# Patient Record
Sex: FEMALE | Race: WHITE | URBAN - METROPOLITAN AREA
[De-identification: names, ages, dates, MRNs, and addresses within clinical notes are randomized per-mention and may not be internally consistent; named-entity substitution may affect disease eponyms.]

---

## 2020-03-12 PROBLEM — Z00.00 ENCOUNTER FOR PREVENTIVE HEALTH EXAMINATION: Status: ACTIVE | Noted: 2020-03-12

## 2023-02-02 ENCOUNTER — OFFICE (OUTPATIENT)
Dept: URBAN - METROPOLITAN AREA CLINIC 38 | Facility: CLINIC | Age: 63
Setting detail: OPHTHALMOLOGY
End: 2023-02-02
Payer: COMMERCIAL

## 2023-02-02 DIAGNOSIS — H11.32: ICD-10-CM

## 2023-02-02 PROBLEM — G43.009 MIGRAINE-W/O AURA: Status: ACTIVE | Noted: 2023-02-02

## 2023-02-02 PROBLEM — D31.40 IRIS NEVUS: Status: ACTIVE | Noted: 2023-02-02

## 2023-02-02 PROCEDURE — 99203 OFFICE O/P NEW LOW 30 MIN: CPT | Performed by: OPHTHALMOLOGY

## 2023-02-02 ASSESSMENT — KERATOMETRY
OD_K1POWER_DIOPTERS: 43.75
OD_AXISANGLE_DEGREES: 62
OS_K1POWER_DIOPTERS: 43.50
METHOD_AUTO_MANUAL: AUTO
OD_K2POWER_DIOPTERS: 44.00
OS_K2POWER_DIOPTERS: 44.25
OS_AXISANGLE_DEGREES: 75

## 2023-02-02 ASSESSMENT — CONFRONTATIONAL VISUAL FIELD TEST (CVF)
OS_FINDINGS: FULL
OD_FINDINGS: FULL

## 2023-02-02 ASSESSMENT — SPHEQUIV_DERIVED
OD_SPHEQUIV: 1.375
OS_SPHEQUIV: 0.875

## 2023-02-02 ASSESSMENT — REFRACTION_CURRENTRX
OS_SPHERE: +2.50
OD_VPRISM_DIRECTION: SV
OS_VPRISM_DIRECTION: SV
OS_OVR_VA: 20/
OD_SPHERE: +2.50
OD_OVR_VA: 20/

## 2023-02-02 ASSESSMENT — REFRACTION_MANIFEST
OD_SPHERE: +0.50
OD_CYLINDER: SPHERE
OS_VA1: 20/20
OS_VA2: 20/20(J1+)
OD_VA2: 20/20(J1+)
OU_VA: 20/20
OS_CYLINDER: SPHERE
OD_VA1: 20/20
OS_SPHERE: +0.25
OS_ADD: +2.50
OD_ADD: +2.50

## 2023-02-02 ASSESSMENT — AXIALLENGTH_DERIVED
OD_AL: 22.9371
OS_AL: 23.1228

## 2023-02-02 ASSESSMENT — REFRACTION_AUTOREFRACTION
OS_SPHERE: +1.00
OD_AXIS: 105
OD_SPHERE: +2.00
OS_CYLINDER: -0.25
OS_AXIS: 131
OD_CYLINDER: -1.25

## 2023-02-02 ASSESSMENT — VISUAL ACUITY
OS_BCVA: 20/30-
OD_BCVA: 20/30-2

## 2023-08-01 ENCOUNTER — OFFICE (OUTPATIENT)
Dept: URBAN - METROPOLITAN AREA CLINIC 8 | Facility: CLINIC | Age: 63
Setting detail: OPHTHALMOLOGY
End: 2023-08-01
Payer: COMMERCIAL

## 2023-08-01 DIAGNOSIS — H35.372: ICD-10-CM

## 2023-08-01 DIAGNOSIS — H52.4: ICD-10-CM

## 2023-08-01 DIAGNOSIS — H25.13: ICD-10-CM

## 2023-08-01 DIAGNOSIS — G43.009: ICD-10-CM

## 2023-08-01 DIAGNOSIS — D31.40: ICD-10-CM

## 2023-08-01 PROCEDURE — 92014 COMPRE OPH EXAM EST PT 1/>: CPT

## 2023-08-01 PROCEDURE — 92015 DETERMINE REFRACTIVE STATE: CPT

## 2023-08-01 PROCEDURE — 92134 CPTRZ OPH DX IMG PST SGM RTA: CPT

## 2023-08-01 ASSESSMENT — KERATOMETRY
METHOD_AUTO_MANUAL: AUTO
OD_K1POWER_DIOPTERS: 43.50
OD_K2POWER_DIOPTERS: 43.75
OS_AXISANGLE_DEGREES: 078
OS_K2POWER_DIOPTERS: 44.25
OS_K1POWER_DIOPTERS: 43.75
OD_AXISANGLE_DEGREES: 068

## 2023-08-01 ASSESSMENT — SPHEQUIV_DERIVED
OD_SPHEQUIV: 1.875
OS_SPHEQUIV: 1.625
OS_SPHEQUIV: 1.5
OD_SPHEQUIV: 2.125

## 2023-08-01 ASSESSMENT — REFRACTION_AUTOREFRACTION
OD_SPHERE: +2.50
OD_CYLINDER: -0.75
OD_AXIS: 101
OS_CYLINDER: -0.25
OS_AXIS: 119
OS_SPHERE: +1.75

## 2023-08-01 ASSESSMENT — VISUAL ACUITY
OS_BCVA: 20/100
OD_BCVA: 20/70

## 2023-08-01 ASSESSMENT — REFRACTION_MANIFEST
OD_ADD: +2.50
OS_VA2: 20/20(J1+)
OS_AXIS: 120
OS_SPHERE: +1.75
OS_ADD: +2.50
OD_VA1: 20/20-2
OS_VA1: 20/20-2
OS_CYLINDER: -0.50
OU_VA: 20/20
OD_CYLINDER: -0.75
OD_VA2: 20/20(J1+)
OD_SPHERE: +2.25
OD_AXIS: 100

## 2023-08-01 ASSESSMENT — AXIALLENGTH_DERIVED
OD_AL: 22.7493
OS_AL: 22.8481
OS_AL: 22.8025
OD_AL: 22.8402

## 2023-08-01 ASSESSMENT — REFRACTION_CURRENTRX
OD_OVR_VA: 20/
OS_OVR_VA: 20/
OS_VPRISM_DIRECTION: SV
OD_VPRISM_DIRECTION: SV
OS_SPHERE: +2.50
OD_SPHERE: +2.50

## 2023-08-01 ASSESSMENT — CONFRONTATIONAL VISUAL FIELD TEST (CVF)
OD_FINDINGS: FULL
OS_FINDINGS: FULL

## 2024-06-12 ENCOUNTER — APPOINTMENT (OUTPATIENT)
Dept: ORTHOPEDIC SURGERY | Facility: CLINIC | Age: 64
End: 2024-06-12
Payer: OTHER MISCELLANEOUS

## 2024-06-12 DIAGNOSIS — M24.131 OTHER ARTICULAR CARTILAGE DISORDERS, RIGHT WRIST: ICD-10-CM

## 2024-06-12 PROCEDURE — 73100 X-RAY EXAM OF WRIST: CPT | Mod: RT

## 2024-06-12 PROCEDURE — 99203 OFFICE O/P NEW LOW 30 MIN: CPT

## 2024-06-12 NOTE — PHYSICAL EXAM
[TFCC] : TFCC [4___] : volarflexion 4[unfilled]/5 [] : good capillary refill in all fingers [Right] : right wrist [There are no fractures, subluxations or dislocations. No significant abnormalities are seen] : There are no fractures, subluxations or dislocations. No significant abnormalities are seen

## 2024-06-12 NOTE — HISTORY OF PRESENT ILLNESS
[de-identified] :  Patient presents for evaluation on RT wrist pain. 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 She typically does a lot of lifting, carrying, moving items.  Patient states this is her initial assessment since she has been so busy at work. Patient presents with wrist strap. States that she is having weakness in her wrist and difficulty with certain ROM. Patient is taking Advil as needed. Patient is RHD.

## 2024-06-12 NOTE — DISCUSSION/SUMMARY
[de-identified] : I reviewed patient's radiographs and discussed her condition and treatment options.  Defer further imaging or injection.  I advised seeing hand OT and fabrication of brace as needed.   Follow up in 4 weeks.  Patient voiced understanding and agreement with the plan.

## 2024-09-18 ENCOUNTER — APPOINTMENT (OUTPATIENT)
Dept: ORTHOPEDIC SURGERY | Facility: CLINIC | Age: 64
End: 2024-09-18
Payer: OTHER MISCELLANEOUS

## 2024-09-18 DIAGNOSIS — M24.131 OTHER ARTICULAR CARTILAGE DISORDERS, RIGHT WRIST: ICD-10-CM

## 2024-09-18 PROCEDURE — 99213 OFFICE O/P EST LOW 20 MIN: CPT

## 2024-09-18 NOTE — DISCUSSION/SUMMARY
[de-identified] : I reviewed patient's prior wrist radiographs and discussed her condition and treatment options.  Given continued pain and interference with ADLS despite NSAIDs, physical therapy, rest, and HEP, I advised obtaining further imaging.  Based on the results of the MRI, more invasive interventions including injections and surgery may be indicated.  Patient voiced understanding and agreement with the plan.

## 2024-09-18 NOTE — PHYSICAL EXAM
[TFCC] : TFCC [4___] : volarflexion 4[unfilled]/5 [Right] : right wrist [There are no fractures, subluxations or dislocations. No significant abnormalities are seen] : There are no fractures, subluxations or dislocations. No significant abnormalities are seen [] : no erythema

## 2024-09-18 NOTE — HISTORY OF PRESENT ILLNESS
[de-identified] :  Since last visit, patient reports that she is still having some stiffness and soreness. States improvement with OT but therapist is wondering if further imaging needs to happen.

## 2024-10-02 ENCOUNTER — APPOINTMENT (OUTPATIENT)
Dept: MRI IMAGING | Facility: CLINIC | Age: 64
End: 2024-10-02
Payer: OTHER MISCELLANEOUS

## 2024-10-02 PROCEDURE — 73221 MRI JOINT UPR EXTREM W/O DYE: CPT | Mod: RT

## 2024-10-03 ENCOUNTER — OFFICE (OUTPATIENT)
Dept: URBAN - METROPOLITAN AREA CLINIC 28 | Facility: CLINIC | Age: 64
Setting detail: OPHTHALMOLOGY
End: 2024-10-03
Payer: COMMERCIAL

## 2024-10-03 VITALS — HEIGHT: 55 IN

## 2024-10-03 DIAGNOSIS — H43.391: ICD-10-CM

## 2024-10-03 DIAGNOSIS — H52.4: ICD-10-CM

## 2024-10-03 DIAGNOSIS — H16.223: ICD-10-CM

## 2024-10-03 DIAGNOSIS — H25.13: ICD-10-CM

## 2024-10-03 DIAGNOSIS — G43.009: ICD-10-CM

## 2024-10-03 DIAGNOSIS — D31.40: ICD-10-CM

## 2024-10-03 DIAGNOSIS — D31.31: ICD-10-CM

## 2024-10-03 DIAGNOSIS — H35.372: ICD-10-CM

## 2024-10-03 PROCEDURE — 92014 COMPRE OPH EXAM EST PT 1/>: CPT | Performed by: OPHTHALMOLOGY

## 2024-10-03 PROCEDURE — 92202 OPSCPY EXTND ON/MAC DRAW: CPT | Performed by: OPHTHALMOLOGY

## 2024-10-03 PROCEDURE — 92015 DETERMINE REFRACTIVE STATE: CPT | Performed by: OPHTHALMOLOGY

## 2024-10-03 PROCEDURE — 92134 CPTRZ OPH DX IMG PST SGM RTA: CPT | Performed by: OPHTHALMOLOGY

## 2024-10-03 ASSESSMENT — TONOMETRY
OS_IOP_MMHG: 14
OD_IOP_MMHG: 13

## 2024-10-03 ASSESSMENT — REFRACTION_AUTOREFRACTION
OS_CYLINDER: -0.25
OD_CYLINDER: -0.50
OD_AXIS: 091
OS_SPHERE: +1.75
OD_SPHERE: +2.50
OS_AXIS: 152

## 2024-10-03 ASSESSMENT — REFRACTION_MANIFEST
OS_AXIS: 150
OS_VA1: 20/20-2
OS_CYLINDER: -0.25
OD_VA1: 20/25+
OD_CYLINDER: -0.75
OS_VA1: 20/20
OD_AXIS: 095
OD_VA2: 20/20(J1+)
OD_ADD: +2.50
OS_VA2: 20/20(J1+)
OS_ADD: +2.50
OS_AXIS: 120
OD_SPHERE: +2.25
OS_ADD: +2.50
OU_VA: 20/20
OD_ADD: +2.50
OS_CYLINDER: -0.50
OS_SPHERE: +1.75
OD_VA1: 20/20-2
OD_AXIS: 100
OD_CYLINDER: -0.25
OS_SPHERE: +1.75
OD_SPHERE: +2.25

## 2024-10-03 ASSESSMENT — KERATOMETRY
OD_K1POWER_DIOPTERS: 43.75
OD_K2POWER_DIOPTERS: 44.25
OS_K2POWER_DIOPTERS: 44.50
OD_AXISANGLE_DEGREES: 087
OS_AXISANGLE_DEGREES: 074
METHOD_AUTO_MANUAL: AUTO
OS_K1POWER_DIOPTERS: 43.50

## 2024-10-03 ASSESSMENT — REFRACTION_CURRENTRX
OS_OVR_VA: 20/
OS_SPHERE: +2.50
OD_SPHERE: +2.50
OD_OVR_VA: 20/
OD_VPRISM_DIRECTION: SV
OS_VPRISM_DIRECTION: SV

## 2024-10-03 ASSESSMENT — TEAR BREAK UP TIME (TBUT)
OS_TBUT: 1+
OD_TBUT: 1+

## 2024-10-03 ASSESSMENT — CONFRONTATIONAL VISUAL FIELD TEST (CVF)
OS_FINDINGS: FULL
OD_FINDINGS: FULL

## 2024-10-03 ASSESSMENT — VISUAL ACUITY
OD_BCVA: 20/40-1
OS_BCVA: 20/80-1

## 2024-10-08 ENCOUNTER — APPOINTMENT (OUTPATIENT)
Dept: MRI IMAGING | Facility: CLINIC | Age: 64
End: 2024-10-08

## 2024-10-11 ENCOUNTER — APPOINTMENT (OUTPATIENT)
Dept: ORTHOPEDIC SURGERY | Facility: CLINIC | Age: 64
End: 2024-10-11
Payer: OTHER MISCELLANEOUS

## 2024-10-11 DIAGNOSIS — M24.131 OTHER ARTICULAR CARTILAGE DISORDERS, RIGHT WRIST: ICD-10-CM

## 2024-10-11 PROCEDURE — 20605 DRAIN/INJ JOINT/BURSA W/O US: CPT | Mod: RT

## 2024-10-11 PROCEDURE — 99214 OFFICE O/P EST MOD 30 MIN: CPT | Mod: 25

## 2024-12-06 ENCOUNTER — APPOINTMENT (OUTPATIENT)
Dept: ORTHOPEDIC SURGERY | Facility: CLINIC | Age: 64
End: 2024-12-06
Payer: OTHER MISCELLANEOUS

## 2024-12-06 DIAGNOSIS — M24.131 OTHER ARTICULAR CARTILAGE DISORDERS, RIGHT WRIST: ICD-10-CM

## 2024-12-06 PROCEDURE — 99213 OFFICE O/P EST LOW 20 MIN: CPT

## 2025-01-17 ENCOUNTER — APPOINTMENT (OUTPATIENT)
Dept: ORTHOPEDIC SURGERY | Facility: CLINIC | Age: 65
End: 2025-01-17
Payer: OTHER MISCELLANEOUS

## 2025-01-17 DIAGNOSIS — M24.131 OTHER ARTICULAR CARTILAGE DISORDERS, RIGHT WRIST: ICD-10-CM

## 2025-01-17 PROCEDURE — 99214 OFFICE O/P EST MOD 30 MIN: CPT

## 2025-01-17 RX ORDER — NAPROXEN 500 MG/1
500 TABLET ORAL
Qty: 30 | Refills: 0 | Status: ACTIVE | COMMUNITY
Start: 2025-01-17 | End: 1900-01-01

## 2025-04-01 ENCOUNTER — TRANSCRIPTION ENCOUNTER (OUTPATIENT)
Age: 65
End: 2025-04-01

## 2025-08-14 ENCOUNTER — NON-APPOINTMENT (OUTPATIENT)
Age: 65
End: 2025-08-14